# Patient Record
Sex: FEMALE | URBAN - METROPOLITAN AREA
[De-identification: names, ages, dates, MRNs, and addresses within clinical notes are randomized per-mention and may not be internally consistent; named-entity substitution may affect disease eponyms.]

---

## 2022-09-16 ENCOUNTER — OFFICE VISIT (OUTPATIENT)
Dept: NEUROSURGERY | Facility: CLINIC | Age: 67
End: 2022-09-16
Payer: COMMERCIAL

## 2022-09-16 VITALS — DIASTOLIC BLOOD PRESSURE: 85 MMHG | HEART RATE: 89 BPM | SYSTOLIC BLOOD PRESSURE: 146 MMHG | OXYGEN SATURATION: 95 %

## 2022-09-16 DIAGNOSIS — M50.10 HERNIATION OF CERVICAL INTERVERTEBRAL DISC WITH RADICULOPATHY: Primary | ICD-10-CM

## 2022-09-16 PROCEDURE — 99203 OFFICE O/P NEW LOW 30 MIN: CPT | Performed by: NEUROLOGICAL SURGERY

## 2022-09-16 RX ORDER — CHLORAL HYDRATE 500 MG
1000 CAPSULE ORAL
COMMUNITY

## 2022-09-16 RX ORDER — EZETIMIBE 10 MG/1
10 TABLET ORAL
COMMUNITY
Start: 2022-01-24 | End: 2023-01-24

## 2022-09-16 RX ORDER — LEVOTHYROXINE SODIUM 112 UG/1
112 TABLET ORAL
COMMUNITY
Start: 2022-05-23

## 2022-09-16 RX ORDER — GABAPENTIN 300 MG/1
300-600 CAPSULE ORAL
COMMUNITY
Start: 2022-08-12 | End: 2023-08-12

## 2022-09-16 ASSESSMENT — PAIN SCALES - GENERAL: PAINLEVEL: MILD PAIN (2)

## 2022-09-16 NOTE — NURSING NOTE
Precious Pizano is a 67 year old female who presents for:  Chief Complaint   Patient presents with     Consult     Surgical Consult  ACDF Herniated disc material, Neck and upper back pain. Previously seen at [facility]/ Dr. Wyatt, notes in chart.. no previous surgery        Initial Vitals:  BP (!) 146/85   Pulse 89   SpO2 95%  There is no height or weight on file to calculate BMI.. There is no height or weight on file to calculate BSA. BP completed using cuff size: large  Mild Pain (2)    Nursing Comments:     Coby Araya MA

## 2022-09-16 NOTE — PROGRESS NOTES
It was a pleasure to see Precious Pizano today in [facility]. She is a 67 year old female who is here for evaluation of her right upper extremity pain.  This began after a long car trip in April.  She was having severe pain radiating from the neck down the arm to the thumb.    She had tried physical therapy with traction, gabapentin, Tylenol, Aleve.    Fortunately in the past month her radicular symptoms have resolved she does have some bilateral periscapular pain.    No past medical history on file.  Past Medical History reviewed with patient during visit.  No past surgical history on file.  Past Surgical History reviewed with patient during visit.  Allergies   Allergen Reactions     Azithromycin Itching     Erythromycin Other (See Comments)     Itchy eyes     Simvastatin Other (See Comments)     Muscle ache       Current Outpatient Medications:      ezetimibe (ZETIA) 10 MG tablet, Take 10 mg by mouth, Disp: , Rfl:      gabapentin (NEURONTIN) 300 MG capsule, Take 300-600 mg by mouth, Disp: , Rfl:      levothyroxine (SYNTHROID/LEVOTHROID) 112 MCG tablet, Take 112 mcg by mouth, Disp: , Rfl:      cholecalciferol 50 MCG (2000 UT) tablet, Take 5,000 Units by mouth, Disp: , Rfl:      fish oil-omega-3 fatty acids 1000 MG capsule, Take 1,000 mg by mouth, Disp: , Rfl:      Multiple Vitamins-Minerals (MULTI VITAMIN  /MINERALS) TABS, Take 1 tablet by mouth, Disp: , Rfl:      Zinc Methionate 50 MG CAPS, Take 50 mg by mouth, Disp: , Rfl:   Social History     Socioeconomic History     Marital status:      No family history on file.     ROS: 10 point ROS neg other than the symptoms noted above in the HPI.    Vitals:    09/16/22 1427   BP: (!) 146/85   Pulse: 89   SpO2: 95%     There is no height or weight on file to calculate BMI.  Mild Pain (2)    Neck Disability Index  No flowsheet data found.    Visual Analog Scale (VAS) Questionnaire    No flowsheet data found.       Awake alert and oriented  Bilateral  upper and lower extremity strength is 5-5 in all muscle groups.  Reflexes symmetric and normal.  Sensation intact to light touch.    Imaging: MRI of the cervical spine from May shows a right-sided herniated disc at C5-6 that is concordant with her symptoms.  She also has more diffuse degenerative changes.  The imaging was reviewed with patient shown to the patient clinic today.    Assessment: Cervical herniated disc with radiculopathy, resolved.    Plan: At this point I think she may continue to  resume normal activities as tolerated.  If her periscapular pain does not continue to improve we might consider a thoracic MRI at some point in the future but I am not terribly concerned about her symptoms at this point.